# Patient Record
Sex: MALE
[De-identification: names, ages, dates, MRNs, and addresses within clinical notes are randomized per-mention and may not be internally consistent; named-entity substitution may affect disease eponyms.]

---

## 2021-10-05 ENCOUNTER — HOSPITAL ENCOUNTER (EMERGENCY)
Dept: HOSPITAL 41 - JD.ED | Age: 36
Discharge: HOME | End: 2021-10-05
Payer: COMMERCIAL

## 2021-10-05 DIAGNOSIS — Z20.822: ICD-10-CM

## 2021-10-05 DIAGNOSIS — J18.9: Primary | ICD-10-CM

## 2021-10-05 PROCEDURE — U0002 COVID-19 LAB TEST NON-CDC: HCPCS

## 2021-10-05 NOTE — EDM.PDOC
ED HPI GENERAL MEDICAL PROBLEM





- General


Chief Complaint: Fever


Stated Complaint: POSS COVID


Time Seen by Provider: 10/05/21 11:32


Source of Information: Reports: Patient, RN Notes Reviewed


History Limitations: Reports: Language Barrier (Ipad  used)





- History of Present Illness


INITIAL COMMENTS - FREE TEXT/NARRATIVE: 





Patient is a 36-year-old male who presents to the ER for evaluation of his 

COVID-19-like illness.  Patient is primarily Togolese-speaking, so the 

 was used for history and exam.  Patient states that about 5 days 

ago, he received the Moderna COVID vaccine, and since then he has been feeling 

rather rundown.  He has a fever, and states he has been taking Tylenol and 

ibuprofen for this but as soon as the medications wore off, the fever come 

straight back and he feels generally unwell again.  The patient states he has 

some general myalgias, fever, nausea with a little bit of epigastric discomfort,

but has not had any sort of diarrhea.  He did have one episode of vomiting last 

night.  He states he does feel generally lethargic as well.  He does not think 

he has been around anyone has been sick.  He has a primary care provider, that 

resides in [place].  Patient states he used to have some issues with anxiety, and

elevated blood pressure and he used to take medication however he is not taking 

any medications at this time.





- Related Data


                                    Allergies











Allergy/AdvReac Type Severity Reaction Status Date / Time


 


No Known Allergies Allergy   Verified 10/05/21 11:33











Home Meds: 


                                    Home Meds





Azithromycin 250 mg PO ASDIRECTED #6 tablet 10/05/21 [Rx]











Past Medical History





- Past Health History


Medical/Surgical History: Denies Medical/Surgical History





Social & Family History





- Tobacco Use


Tobacco Use Status *Q: Never Tobacco User





- Caffeine Use


Caffeine Use: Reports: Soda





- Recreational Drug Use


Recreational Drug Use: No





ED ROS ENT





- Review of Systems


Review Of Systems: Comprehensive ROS is negative, except as noted in HPI.





ED EXAM, ENT





- Physical Exam


Exam: See Below


Exam Limited By: No Limitations


General Appearance: Alert, WD/WN, No Apparent Distress


Respiratory/Chest: No Respiratory Distress, Lungs Clear, Normal Breath Sounds, 

No Accessory Muscle Use, Chest Non-Tender


Cardiovascular: Normal Peripheral Pulses, Regular Rate, Rhythm, No Edema


GI/Abdominal: Normal Bowel Sounds, Soft, No Distention, No Mass, Tender (very 

slight tenderness over the epigastrium)


Extremities: Normal Inspection, Normal Capillary Refill


Neurological: Alert, Oriented, Normal Cognition, No Motor/Sensory Deficits


Psychiatric: Normal Affect, Normal Mood


Skin: Warm, Dry, Intact, Normal Color, No Rash





Course





- Vital Signs


Last Recorded V/S: 


                                Last Vital Signs











Temp  98.9 F   10/05/21 11:29


 


Pulse  78   10/05/21 11:29


 


Resp  16   10/05/21 11:29


 


BP  140/93 H  10/05/21 11:29


 


Pulse Ox  97   10/05/21 11:29














- Orders/Labs/Meds


Labs: 


                                Laboratory Tests











  10/05/21 10/05/21 10/05/21 Range/Units





  12:17 12:17 13:09 


 


WBC  4.37    (4.23-9.07)  K/mm3


 


RBC  4.57 L    (4.63-6.08)  M/mm3


 


Hgb  14.4    (13.7-17.5)  gm/dl


 


Hct  43.3    (40.1-51.0)  %


 


MCV  94.7 H    (79.0-92.2)  fl


 


MCH  31.5    (25.7-32.2)  pg


 


MCHC  33.3    (32.2-35.5)  g/dl


 


RDW Std Deviation  42.1    (35.1-43.9)  fL


 


Plt Count  185    (163-337)  K/mm3


 


MPV  10.0    (9.4-12.3)  fl


 


Neut % (Auto)  60.5    (34.0-67.9)  %


 


Lymph % (Auto)  27.0    (21.8-53.1)  %


 


Mono % (Auto)  11.9    (5.3-12.2)  %


 


Eos % (Auto)  0.2 L    (0.8-7.0)  


 


Baso % (Auto)  0.2    (0.1-1.2)  %


 


Neut # (Auto)  2.64    (1.78-5.38)  K/mm3


 


Lymph # (Auto)  1.18 L    (1.32-3.57)  K/mm3


 


Mono # (Auto)  0.52    (0.30-0.82)  K/mm3


 


Eos # (Auto)  0.01 L    (0.04-0.54)  K/mm3


 


Baso # (Auto)  0.01    (0.01-0.08)  K/mm3


 


Sodium   141   (136-145)  mEq/L


 


Potassium   3.9   (3.5-5.1)  mEq/L


 


Chloride   103   ()  mEq/L


 


Carbon Dioxide   29   (21-32)  mEq/L


 


Anion Gap   12.9   (5-15)  


 


BUN   13   (7-18)  mg/dL


 


Creatinine   1.3   (0.7-1.3)  mg/dL


 


Est Cr Clr Drug Dosing   82.86   mL/min


 


Estimated GFR (MDRD)   > 60   (>60)  mL/min


 


BUN/Creatinine Ratio   10.0 L   (14-18)  


 


Glucose   101 H   (70-99)  mg/dL


 


Calcium   8.3 L   (8.5-10.1)  mg/dL


 


Total Bilirubin   0.3   (0.2-1.0)  mg/dL


 


AST   23   (15-37)  U/L


 


ALT   37   (16-63)  U/L


 


Alkaline Phosphatase   60   ()  U/L


 


C-Reactive Protein   0.6   (<1.0)  mg/dL


 


Total Protein   7.3   (6.4-8.2)  g/dl


 


Albumin   3.6   (3.4-5.0)  g/dl


 


Globulin   3.7   gm/dL


 


Albumin/Globulin Ratio   1.0   (1-2)  


 


SARS-CoV-2 RNA (RAFI)    Negative  (NEGATIVE)  














- Re-Assessments/Exams


Free Text/Narrative Re-Assessment/Exam: 





10/05/21 11:48


Patient presents to the ER for the evaluation of his COVID-19-like illness.  A 

Covid swab was obtained at time of triage, we will get some basic labs and a 

chest x-ray for further evaluation.





10/05/21 13:02


Laboratory evaluation has come back, CBC is unremarkable, CMP is unremarkable, 

CRP is unremarkable.  Covid screen is still pending at this time.  Patient's 

chest x-ray did show a subtle possible area of consolidation in the right lower 

lung field.  Suspect pneumonia, please correlate patient's symptoms.





10/05/21 14:24


Patient's COVID-19 screen did come back negative.  Since there is only one small

 area of consolidation in the right lower lung, we will go ahead and give him a 

course of azithromycin for suspected community-acquired pneumonia.  We will have

 him follow-up in clinic if not feeling much better in a few days.





Departure





- Departure


Time of Disposition: 14:49


Disposition: Home, Self-Care 01


Condition: Good


Clinical Impression: 


RLL pneumonia


Qualifiers:


 Pneumonia type: due to unspecified organism Qualified Code(s): J18.9 - 

Pneumonia, unspecified organism








- Discharge Information


*PRESCRIPTION DRUG MONITORING PROGRAM REVIEWED*: No


*COPY OF PRESCRIPTION DRUG MONITORING REPORT IN PATIENT BERNADETTE: No


Instructions:  Community-Acquired Pneumonia, Adult, Easy-to-Read


Forms:  ED Department Discharge, ED Return to Work/School Form


Additional Instructions: 


You were evaluated in the ER today for your COVID-19-like symptoms.





Laboratory evaluation, a COVID-19 screen, and a chest x-ray were performed at 

today's visit. Your COVID-19 screen did come back negative. Your chest x-ray did

 show a sign of right lower lobe pneumonia, which could be the source of your 

fever.





We will go ahead and get you started on oral antibiotics, you will need to take 

as directed unless told otherwise by different provider. This medication was 

electronically sent to the [place] pharmacy located in the [facility] grocery store.

 This antibiotic can take up to 48 hours to start providing effect, so please 

note you may not feel better right away.





You may continue using Tylenol ibuprofen every 6 hours as needed for ongoing 

pain or fever management.





If your symptoms should change or worsen, do not hesitate to return to the ER 

for ongoing management.





Duartechristos te evaluaron en la gary de emergencias por tus sntomas similares al COVID-

19.





En la visita de chapin se realizaron domenico evaluacin de laboratorio, domenico pantalla de

 COVID-19 y domenico radiografa de trax. Small pantalla COVID-19 result negativa. Small 

radiografa de trax mostr un signo de neumona del lbulo inferior derecho, 

que podra ser la angela de small fiebre.





Continuaremos y le ayudaremos a comenzar con los antibiticos orales; deber 

tomarlos segn las indicaciones, a menos que un proveedor diferente le indique 

lo contrario. Keiko medicamento se envi electrnicamente a la farmacia [place] 

ubicada en el [employer]merc[employer]. Keiko antibitico puede tardar hasta 48 

horas en comenzar a surtir efecto, por lo que es posible que no se sienta mejor 

de inmediato.





Puede continuar usando ibuprofeno de Tylenol cada 6 horas segn sea necesario 

para el control continuo del dolor o la fiebre.





Si moni sntomas cambian o empeoran, no dude en regresar a la gary de emergencias

 para un tratamiento continuo. 





Sepsis Event Note (ED)





- Evaluation


Sepsis Screening Result: No Definite Risk





- Focused Exam


Vital Signs: 


                                   Vital Signs











  Temp Pulse Resp BP Pulse Ox


 


 10/05/21 11:29  98.9 F  78  16  140/93 H  97

## 2021-10-05 NOTE — CR
Chest: Portable view of the chest was obtained.

 

Comparison: No prior chest imaging is available.

 

Questionable density within the right lung base is noted.  Lungs 

otherwise are clear.  Heart size and mediastinum are normal.  Bony 

structures show nothing acute.

 

Impression:

1.  Questionable density within the right lung base.  Difficult to 

exclude minimal area of pneumonia if patient has correlating symptoms.

2.  Portable chest x-ray is otherwise unremarkable.

 

Diagnostic code #3